# Patient Record
Sex: FEMALE | Race: BLACK OR AFRICAN AMERICAN | Employment: FULL TIME | URBAN - METROPOLITAN AREA
[De-identification: names, ages, dates, MRNs, and addresses within clinical notes are randomized per-mention and may not be internally consistent; named-entity substitution may affect disease eponyms.]

---

## 2017-08-29 ENCOUNTER — HOSPITAL ENCOUNTER (EMERGENCY)
Facility: HOSPITAL | Age: 27
Discharge: HOME | End: 2017-08-29
Attending: EMERGENCY MEDICINE

## 2017-08-29 ENCOUNTER — APPOINTMENT (OUTPATIENT)
Dept: ULTRASOUND IMAGING | Facility: HOSPITAL | Age: 27
End: 2017-08-29
Attending: EMERGENCY MEDICINE

## 2017-08-29 VITALS
HEIGHT: 61 IN | TEMPERATURE: 97 F | BODY MASS INDEX: 49.09 KG/M2 | WEIGHT: 260 LBS | SYSTOLIC BLOOD PRESSURE: 134 MMHG | HEART RATE: 58 BPM | DIASTOLIC BLOOD PRESSURE: 86 MMHG | OXYGEN SATURATION: 98 % | RESPIRATION RATE: 18 BRPM

## 2017-08-29 DIAGNOSIS — N39.0 URINARY TRACT INFECTION WITHOUT HEMATURIA, SITE UNSPECIFIED: Primary | ICD-10-CM

## 2017-08-29 DIAGNOSIS — O20.0 THREATENED ABORTION: ICD-10-CM

## 2017-08-29 LAB
B-HCG SERPL-ACNC: 1742 MIU/ML
B-HCG UR QL: POSITIVE
BASOPHILS # BLD: 0.1 K/UL (ref 0–0.2)
BASOPHILS NFR BLD: 1 %
BILIRUB UR QL: NEGATIVE
COLOR UR: YELLOW
EOSINOPHIL # BLD: 0.3 K/UL (ref 0–0.7)
EOSINOPHIL NFR BLD: 3 %
ERYTHROCYTE [DISTWIDTH] IN BLOOD BY AUTOMATED COUNT: 19 % (ref 11–15)
GLUCOSE UR-MCNC: NEGATIVE MG/DL
HCT VFR BLD AUTO: 33.9 % (ref 35–48)
HGB BLD-MCNC: 10.5 G/DL (ref 12–16)
KETONES UR-MCNC: NEGATIVE MG/DL
LYMPHOCYTES # BLD: 2.2 K/UL (ref 1–4)
LYMPHOCYTES NFR BLD: 24 %
MCH RBC QN AUTO: 21.8 PG (ref 27–32)
MCHC RBC AUTO-ENTMCNC: 31 G/DL (ref 32–37)
MCV RBC AUTO: 70.3 FL (ref 80–100)
MONOCYTES # BLD: 1.3 K/UL (ref 0–1)
MONOCYTES NFR BLD: 14 %
NEUTROPHILS # BLD AUTO: 5.3 K/UL (ref 1.8–7.7)
NEUTROPHILS NFR BLD: 59 %
NITRITE UR QL STRIP.AUTO: POSITIVE
PH UR: 5 [PH] (ref 5–8)
PLATELET # BLD AUTO: 280 K/UL (ref 140–400)
PMV BLD AUTO: 8.8 FL (ref 7.4–10.3)
PROT UR-MCNC: 30 MG/DL
RBC # BLD AUTO: 4.82 M/UL (ref 3.7–5.4)
RBC #/AREA URNS AUTO: 191 /HPF
RH BLOOD TYPE: POSITIVE
SP GR UR STRIP: 1.03 (ref 1–1.03)
UROBILINOGEN UR STRIP-ACNC: 4
VIT C UR-MCNC: NEGATIVE MG/DL
WBC # BLD AUTO: 9 K/UL (ref 4–11)
WBC #/AREA URNS AUTO: 39 /HPF

## 2017-08-29 PROCEDURE — 84702 CHORIONIC GONADOTROPIN TEST: CPT | Performed by: EMERGENCY MEDICINE

## 2017-08-29 PROCEDURE — 81001 URINALYSIS AUTO W/SCOPE: CPT | Performed by: EMERGENCY MEDICINE

## 2017-08-29 PROCEDURE — 81025 URINE PREGNANCY TEST: CPT

## 2017-08-29 PROCEDURE — 86901 BLOOD TYPING SEROLOGIC RH(D): CPT | Performed by: EMERGENCY MEDICINE

## 2017-08-29 PROCEDURE — 87086 URINE CULTURE/COLONY COUNT: CPT

## 2017-08-29 PROCEDURE — 87147 CULTURE TYPE IMMUNOLOGIC: CPT | Performed by: EMERGENCY MEDICINE

## 2017-08-29 PROCEDURE — 99284 EMERGENCY DEPT VISIT MOD MDM: CPT

## 2017-08-29 PROCEDURE — 36415 COLL VENOUS BLD VENIPUNCTURE: CPT

## 2017-08-29 PROCEDURE — 76817 TRANSVAGINAL US OBSTETRIC: CPT | Performed by: EMERGENCY MEDICINE

## 2017-08-29 PROCEDURE — 86900 BLOOD TYPING SEROLOGIC ABO: CPT | Performed by: EMERGENCY MEDICINE

## 2017-08-29 PROCEDURE — 76801 OB US < 14 WKS SINGLE FETUS: CPT | Performed by: EMERGENCY MEDICINE

## 2017-08-29 PROCEDURE — 81001 URINALYSIS AUTO W/SCOPE: CPT

## 2017-08-29 PROCEDURE — 87086 URINE CULTURE/COLONY COUNT: CPT | Performed by: EMERGENCY MEDICINE

## 2017-08-29 PROCEDURE — 85025 COMPLETE CBC W/AUTO DIFF WBC: CPT | Performed by: EMERGENCY MEDICINE

## 2017-08-29 RX ORDER — CEPHALEXIN 250 MG/5ML
500 POWDER, FOR SUSPENSION ORAL 3 TIMES DAILY
Qty: 150 ML | Refills: 0 | Status: SHIPPED | OUTPATIENT
Start: 2017-08-29 | End: 2017-09-03

## 2017-08-30 NOTE — ED PROVIDER NOTES
Patient Seen in: [facility] AND [facility] Emergency Department    History   Patient presents with:  Abdomen/Flank Pain (GI/)    Stated Complaint: abd pain    HPI    Patient is a 81-year-old  A1 who presents with lower abdominal cramping ×5 days.   Patient no cvat  Extremities: FROM of all extremities, no cyanosis/clubbing/edema  Neuro: CN intact, normal speech, normal gait, 5/5 motor strength in all extremities, no focal deficits  SKIN: warm, dry, no rashes        ED Course     Labs Reviewed   URINALYSIS [place] 2017  CONCLUSION:  1. Irregularly marginated gestational sac within the lower uterine segment. No fetal pole identified. No fetal cardiac activity recorded.  Findings are most suggestive of spontaneous  given low position of the gestational sac

## 2018-12-16 ENCOUNTER — HOSPITAL ENCOUNTER (INPATIENT)
Facility: HOSPITAL | Age: 28
LOS: 2 days | Discharge: HOME | End: 2018-12-18
Attending: OBSTETRICS & GYNECOLOGY | Admitting: OBSTETRICS & GYNECOLOGY
Payer: MEDICAID

## 2018-12-16 ENCOUNTER — ANESTHESIA (OUTPATIENT)
Dept: OBGYN UNIT | Facility: HOSPITAL | Age: 28
End: 2018-12-16
Payer: MEDICAID

## 2018-12-16 ENCOUNTER — ANESTHESIA EVENT (OUTPATIENT)
Dept: OBGYN UNIT | Facility: HOSPITAL | Age: 28
End: 2018-12-16
Payer: MEDICAID

## 2018-12-16 PROBLEM — O09.30 NO PRENATAL CARE IN CURRENT PREGNANCY: Status: ACTIVE | Noted: 2018-12-16

## 2018-12-16 PROBLEM — Z78.9 NOT IMMUNE TO RUBELLA: Status: ACTIVE | Noted: 2018-12-16

## 2018-12-16 PROBLEM — Z37.9 NORMAL LABOR: Status: ACTIVE | Noted: 2018-12-16

## 2018-12-16 PROCEDURE — 59409 OBSTETRICAL CARE: CPT | Performed by: OBSTETRICS & GYNECOLOGY

## 2018-12-16 RX ORDER — AMMONIA INHALANTS 0.04 G/.3ML
0.3 INHALANT RESPIRATORY (INHALATION) AS NEEDED
Status: DISCONTINUED | OUTPATIENT
Start: 2018-12-16 | End: 2018-12-18

## 2018-12-16 RX ORDER — PRENATAL VIT,CAL 76/IRON/FOLIC 29 MG-1 MG
1 TABLET ORAL DAILY
Status: DISCONTINUED | OUTPATIENT
Start: 2018-12-16 | End: 2018-12-18

## 2018-12-16 RX ORDER — EPHEDRINE SULFATE/0.9% NACL/PF 25 MG/5 ML
5 SYRINGE (ML) INTRAVENOUS AS NEEDED
Status: DISCONTINUED | OUTPATIENT
Start: 2018-12-16 | End: 2018-12-16

## 2018-12-16 RX ORDER — NALBUPHINE HCL 10 MG/ML
2.5 AMPUL (ML) INJECTION
Status: DISCONTINUED | OUTPATIENT
Start: 2018-12-16 | End: 2018-12-16

## 2018-12-16 RX ORDER — LIDOCAINE HYDROCHLORIDE 10 MG/ML
30 INJECTION, SOLUTION EPIDURAL; INFILTRATION; INTRACAUDAL; PERINEURAL ONCE
Status: DISCONTINUED | OUTPATIENT
Start: 2018-12-16 | End: 2018-12-16

## 2018-12-16 RX ORDER — TRISODIUM CITRATE DIHYDRATE AND CITRIC ACID MONOHYDRATE 500; 334 MG/5ML; MG/5ML
30 SOLUTION ORAL AS NEEDED
Status: DISCONTINUED | OUTPATIENT
Start: 2018-12-16 | End: 2018-12-16

## 2018-12-16 RX ORDER — SODIUM CHLORIDE, SODIUM LACTATE, POTASSIUM CHLORIDE, CALCIUM CHLORIDE 600; 310; 30; 20 MG/100ML; MG/100ML; MG/100ML; MG/100ML
INJECTION, SOLUTION INTRAVENOUS
Status: DISPENSED
Start: 2018-12-16 | End: 2018-12-16

## 2018-12-16 RX ORDER — TERBUTALINE SULFATE 1 MG/ML
0.25 INJECTION, SOLUTION SUBCUTANEOUS AS NEEDED
Status: DISCONTINUED | OUTPATIENT
Start: 2018-12-16 | End: 2018-12-16

## 2018-12-16 RX ORDER — DIAPER,BRIEF,INFANT-TODD,DISP
1 EACH MISCELLANEOUS EVERY 6 HOURS PRN
Status: DISCONTINUED | OUTPATIENT
Start: 2018-12-16 | End: 2018-12-18

## 2018-12-16 RX ORDER — SODIUM CHLORIDE 0.9 % (FLUSH) 0.9 %
10 SYRINGE (ML) INJECTION AS NEEDED
Status: DISCONTINUED | OUTPATIENT
Start: 2018-12-16 | End: 2018-12-16

## 2018-12-16 RX ORDER — BUPIVACAINE HYDROCHLORIDE 2.5 MG/ML
INJECTION, SOLUTION EPIDURAL; INFILTRATION; INTRACAUDAL
Status: DISPENSED
Start: 2018-12-16 | End: 2018-12-16

## 2018-12-16 RX ORDER — ONDANSETRON 2 MG/ML
4 INJECTION INTRAMUSCULAR; INTRAVENOUS EVERY 6 HOURS PRN
Status: DISCONTINUED | OUTPATIENT
Start: 2018-12-16 | End: 2018-12-18

## 2018-12-16 RX ORDER — LIDOCAINE HYDROCHLORIDE 10 MG/ML
INJECTION, SOLUTION EPIDURAL; INFILTRATION; INTRACAUDAL; PERINEURAL AS NEEDED
Status: DISCONTINUED | OUTPATIENT
Start: 2018-12-16 | End: 2018-12-16 | Stop reason: SURG

## 2018-12-16 RX ORDER — METHYLERGONOVINE MALEATE 0.2 MG/ML
INJECTION INTRAVENOUS
Status: DISPENSED
Start: 2018-12-16 | End: 2018-12-17

## 2018-12-16 RX ORDER — LIDOCAINE HYDROCHLORIDE AND EPINEPHRINE 20; 5 MG/ML; UG/ML
10 INJECTION, SOLUTION EPIDURAL; INFILTRATION; INTRACAUDAL; PERINEURAL ONCE
Status: DISCONTINUED | OUTPATIENT
Start: 2018-12-16 | End: 2018-12-16

## 2018-12-16 RX ORDER — ONDANSETRON 2 MG/ML
INJECTION INTRAMUSCULAR; INTRAVENOUS
Status: COMPLETED
Start: 2018-12-16 | End: 2018-12-16

## 2018-12-16 RX ORDER — IBUPROFEN 600 MG/1
600 TABLET ORAL ONCE AS NEEDED
Status: DISCONTINUED | OUTPATIENT
Start: 2018-12-16 | End: 2018-12-16

## 2018-12-16 RX ORDER — SODIUM CHLORIDE, SODIUM LACTATE, POTASSIUM CHLORIDE, CALCIUM CHLORIDE 600; 310; 30; 20 MG/100ML; MG/100ML; MG/100ML; MG/100ML
INJECTION, SOLUTION INTRAVENOUS
Status: COMPLETED
Start: 2018-12-16 | End: 2018-12-16

## 2018-12-16 RX ORDER — IBUPROFEN 600 MG/1
600 TABLET ORAL EVERY 6 HOURS
Status: DISCONTINUED | OUTPATIENT
Start: 2018-12-16 | End: 2018-12-18

## 2018-12-16 RX ORDER — ONDANSETRON 2 MG/ML
4 INJECTION INTRAMUSCULAR; INTRAVENOUS EVERY 6 HOURS PRN
Status: DISCONTINUED | OUTPATIENT
Start: 2018-12-16 | End: 2018-12-16

## 2018-12-16 RX ORDER — PRENATAL VIT/IRON FUM/FOLIC AC 27MG-0.8MG
1 TABLET ORAL DAILY
COMMUNITY
End: 2019-01-22

## 2018-12-16 RX ORDER — DEXTROSE, SODIUM CHLORIDE, SODIUM LACTATE, POTASSIUM CHLORIDE, AND CALCIUM CHLORIDE 5; .6; .31; .03; .02 G/100ML; G/100ML; G/100ML; G/100ML; G/100ML
125 INJECTION, SOLUTION INTRAVENOUS CONTINUOUS
Status: DISCONTINUED | OUTPATIENT
Start: 2018-12-16 | End: 2018-12-16

## 2018-12-16 RX ORDER — BUPIVACAINE HYDROCHLORIDE 2.5 MG/ML
10 INJECTION, SOLUTION EPIDURAL; INFILTRATION; INTRACAUDAL ONCE
Status: COMPLETED | OUTPATIENT
Start: 2018-12-16 | End: 2018-12-16

## 2018-12-16 RX ORDER — 0.9 % SODIUM CHLORIDE 0.9 %
VIAL (ML) INJECTION
Status: DISPENSED
Start: 2018-12-16 | End: 2018-12-16

## 2018-12-16 RX ORDER — LIDOCAINE HYDROCHLORIDE AND EPINEPHRINE 15; 5 MG/ML; UG/ML
INJECTION, SOLUTION EPIDURAL AS NEEDED
Status: DISCONTINUED | OUTPATIENT
Start: 2018-12-16 | End: 2018-12-16 | Stop reason: SURG

## 2018-12-16 RX ORDER — DOCUSATE SODIUM 100 MG/1
100 CAPSULE, LIQUID FILLED ORAL 2 TIMES DAILY
Status: DISCONTINUED | OUTPATIENT
Start: 2018-12-16 | End: 2018-12-18

## 2018-12-16 RX ORDER — SIMETHICONE 80 MG
80 TABLET,CHEWABLE ORAL 3 TIMES DAILY PRN
Status: DISCONTINUED | OUTPATIENT
Start: 2018-12-16 | End: 2018-12-18

## 2018-12-16 RX ORDER — BISACODYL 10 MG
10 SUPPOSITORY, RECTAL RECTAL ONCE AS NEEDED
Status: DISCONTINUED | OUTPATIENT
Start: 2018-12-16 | End: 2018-12-18

## 2018-12-16 RX ORDER — EPHEDRINE SULFATE/0.9% NACL/PF 25 MG/5 ML
SYRINGE (ML) INTRAVENOUS
Status: DISCONTINUED
Start: 2018-12-16 | End: 2018-12-16 | Stop reason: WASHOUT

## 2018-12-16 RX ORDER — SODIUM CHLORIDE 0.9 % (FLUSH) 0.9 %
10 SYRINGE (ML) INJECTION AS NEEDED
Status: DISCONTINUED | OUTPATIENT
Start: 2018-12-16 | End: 2018-12-18

## 2018-12-16 RX ORDER — AMMONIA INHALANTS 0.04 G/.3ML
0.3 INHALANT RESPIRATORY (INHALATION) AS NEEDED
Status: DISCONTINUED | OUTPATIENT
Start: 2018-12-16 | End: 2018-12-16

## 2018-12-16 RX ORDER — ACETAMINOPHEN 325 MG/1
650 TABLET ORAL EVERY 6 HOURS PRN
Status: DISCONTINUED | OUTPATIENT
Start: 2018-12-16 | End: 2018-12-18

## 2018-12-16 RX ADMIN — BUPIVACAINE HYDROCHLORIDE 10 ML: 2.5 INJECTION, SOLUTION EPIDURAL; INFILTRATION; INTRACAUDAL at 06:42:00

## 2018-12-16 RX ADMIN — LIDOCAINE HYDROCHLORIDE 5 ML: 10 INJECTION, SOLUTION EPIDURAL; INFILTRATION; INTRACAUDAL; PERINEURAL at 06:37:00

## 2018-12-16 RX ADMIN — LIDOCAINE HYDROCHLORIDE AND EPINEPHRINE 3 ML: 15; 5 INJECTION, SOLUTION EPIDURAL at 06:42:00

## 2018-12-16 NOTE — ANESTHESIA PROCEDURE NOTES
Labor Analgesia  Performed by: Danii Main MD  Authorized by: Danii Main MD     Patient Location:  OB  Reason for Block: labor epidural    Anesthesiologist:  Danii Main MD  Performed by:   Anesthesiologist  Preanesthetic Checklist: lalo

## 2018-12-16 NOTE — ANESTHESIA POSTPROCEDURE EVALUATION
Patient: Anabel Kiran    Procedure Summary     Date:  12/16/18 Room / Location:      Anesthesia Start:  0636 Anesthesia Stop:  0288    Procedure:  LABOR ANALGESIA Diagnosis:      Scheduled Providers:   Anesthesiologist:  MD Leticia Marques

## 2018-12-16 NOTE — H&P
Leonora 23 Patient Status:  Inpatient    1990 MRN [id number]   Location [address] [place] [address] Attending Marianna Max MD   Hosp Day # 0 PCP PHYSICIAN NONSTAFF     Date of Ad 12/15/2018 at Unknown time     Allergies: No Known Allergies    OBJECTIVE:    Vitals:    AFVSS     Constitution:  Obese female in mild to moderate acute distress   HEENT:  WNL   Neck:  no thyromegaly or lymphadenopathy    Lungs:   clear to ausculation bila

## 2018-12-16 NOTE — PROGRESS NOTES
12/16/2018, 10:05 AM    Subjective:  Patient is comfortable with epidural. Called because she thinks her water broke.      Objective:   12/16/18  0800 12/16/18 0827 12/16/18  0900 12/16/18 0918   BP:  137/65     Pulse:  56  57   Temp:   99.5 °F (37.5 °C)

## 2018-12-16 NOTE — PLAN OF CARE
POSTPARTUM    • Optimize infant feeding at the breast Not Progressing    • Establishment of adequate milk supply with medication/procedure interruptions Not Progressing    • Experiences normal breast weaning course Not Jean Cerda has stated t

## 2018-12-16 NOTE — ANESTHESIA PREPROCEDURE EVALUATION
Anesthesia PreOp Note    HPI:     Elise Perera is a 33year old female who presents for preoperative consultation requested by: * No surgeons listed *    Date of Surgery: 12/16/2018    * No procedures listed *  Indication: * No pre-op diagnosis entered epidural infusion  Epidural Continuous Kalli Armenta MD Last Rate: 10 mL/hr at 12/16/18 0646 10 mL/hr at 12/16/18 0646   fentaNYL citrate (SUBLIMAZE) 0.05 MG/ML injection 100 mcg 100 mcg Epidural Once Kalli Armenta MD     EPHEDrine sulfate in NaCl file      Sexual activity: Not on file    Other Topics      Concerns:        Not on file    Social History Narrative      Not on file      Available pre-op labs reviewed.   Lab Results   Component Value Date    WBC 8.6 12/16/2018    RBC 4.67 12/16/2018    H

## 2018-12-16 NOTE — PROGRESS NOTES
Pt is a 33year old female admitted to TR2/TR2-A. Patient presents with:  Onset Of Labor: Pt c/o cntxs since 100. Pt denies vaginal bleeding or LOF.  +FM     Pt is  40w6d intra-uterine pregnancy. History obtained, consents signed.  Oriented to r

## 2018-12-16 NOTE — PROGRESS NOTES
Dr. Kiki Saleh in department and made aware of pt arrival to unit and c/o cntxs as well as h/o no PNC and EDC per pt per [facility],[address]. Dr. Kiki Saleh aware unable to palpate presenting part upon exam.  Dawson Mondragon brought to bedside awaiting Dr. Kiki Saleh.

## 2018-12-16 NOTE — L&D DELIVERY NOTE
[facility]    Vaginal Delivery Note    Jayson Olivia Patient Status:  Inpatient    1990 MRN [id number]   Location [address]  Attending Regino Crenshaw MD   Hosp Day # 0 PCP PHYSICIAN JONO Lopez

## 2018-12-17 PROCEDURE — 99232 SBSQ HOSP IP/OBS MODERATE 35: CPT | Performed by: OBSTETRICS & GYNECOLOGY

## 2018-12-17 RX ORDER — MELATONIN
325 2 TIMES DAILY WITH MEALS
Status: DISCONTINUED | OUTPATIENT
Start: 2018-12-17 | End: 2018-12-18

## 2018-12-17 NOTE — PROGRESS NOTES
[facility]    OB/GYNE Progress Note      Elise Perera Patient Status:  Inpatient    1990 MRN [id number]   Location [facility] 3SE Attending Kathya Kate MD   Hosp Day # 1 PCP PHYSICIAN NONSTAFF        Assessment/Plan 12/17/2018       Lab Results   Component Value Date    COLORUR Yellow 08/29/2017    CLARITY Cloudy (A) 08/29/2017    SPECGRAVITY 1.026 08/29/2017    PROUR 30  (A) 08/29/2017    GLUUR Negative 08/29/2017    KETUR Negative 08/29/2017    BILUR Negative 08/29/

## 2018-12-17 NOTE — CM/SW NOTE
MDO received indicating pt's previous homelessness during pregnancy. Pt did not establish regular prenatal care for this pregnancy, having intermittent care between [facility] and Robbi Epperson, ultimately delivering at [address] [place] [address] via walk-in.  Notes indicated she expec

## 2018-12-18 VITALS
RESPIRATION RATE: 16 BRPM | OXYGEN SATURATION: 100 % | SYSTOLIC BLOOD PRESSURE: 128 MMHG | TEMPERATURE: 98 F | HEIGHT: 67 IN | HEART RATE: 57 BPM | DIASTOLIC BLOOD PRESSURE: 62 MMHG | BODY MASS INDEX: 33.74 KG/M2 | WEIGHT: 215 LBS

## 2018-12-18 PROCEDURE — 99238 HOSP IP/OBS DSCHRG MGMT 30/<: CPT | Performed by: OBSTETRICS & GYNECOLOGY

## 2018-12-18 RX ORDER — IBUPROFEN 600 MG/1
600 TABLET ORAL EVERY 6 HOURS
Qty: 30 TABLET | Refills: 0 | Status: SHIPPED | OUTPATIENT
Start: 2018-12-18 | End: 2019-01-22

## 2018-12-18 RX ORDER — MELATONIN
325 2 TIMES DAILY WITH MEALS
Qty: 60 TABLET | Refills: 0 | Status: ON HOLD | OUTPATIENT
Start: 2018-12-18 | End: 2019-09-10

## 2018-12-18 NOTE — PLAN OF CARE
Patient educated on signs of illness, increase bleeding, signs of infection, post partum. Mother bonding with  appropriately. Mother care and feeding of  is effective.

## 2018-12-18 NOTE — PROGRESS NOTES
[facility]    OB/GYNE Progress Note      Erlinda Mormonism Patient Status:  Inpatient    1990 MRN [id number]   Location [facility] 3SE Attending Sarah Thomas MD   Hosp Day # 2 PCP PHYSICIAN NONSTAFF        Assessment/Plan     No Negative 10/21/2018    ABO O 12/16/2018    RH Positive 12/16/2018    WBC 13.2 (H) 12/17/2018    HGB 8.2 (L) 12/17/2018    HCT 26.4 (L) 12/17/2018     12/17/2018    AST 18 12/17/2018    ALT 10 (L) 12/17/2018       Lab Results   Component Value Date

## 2018-12-18 NOTE — DISCHARGE SUMMARY
[facility]    Discharge Summary    Mary Cortez Patient Status:  Inpatient    1990 MRN [id number]   Location [facility] 3SE Attending Sushila Keenan MD   [address] Day # 2       Primary OB Clinician: No prenatal care.     EDC: Es

## 2018-12-18 NOTE — BH PROGRESS NOTE
Postpartum Assessment Questions to be used when patient scores a 10 or higher on the [place] Postpartum Depression Scale  1. How is your appetite? Are you hungrier or less hungry than usual?  Eating regularly? Normal appetite  2.  Are you sleeping okay w per patient, her mom, brother and PHILL. a. Is it what you expected and what you need? Yes  19. Are you taking any medications regularly? No  20. How would you feel about taking medication for depression if it makes you feel better?  N/A  Patient’s presenta

## 2018-12-18 NOTE — CM/SW NOTE
SW discussed with Psych Liaison/Melanie who had met with the pt for assessment and resources. Pt had declined any needs. SW spoke with the pt who currently declines any needs or concerns.  Pt verified insurance plans, pediatric plans, had no other questio

## 2019-01-07 ENCOUNTER — TELEPHONE (OUTPATIENT)
Dept: ADMINISTRATIVE | Age: 29
End: 2019-01-07

## 2019-01-10 NOTE — TELEPHONE ENCOUNTER
Dr. Jolie Martin,    Patient did not have prenatal care but delivered at [facility] and has a postpartum appt. with you on 1/21/19. I have attached a FMLA that requires your signature. Patient want to be off 6 weeks.     Please sign off on form:  -Highlight the pat

## 2019-01-11 ENCOUNTER — TELEPHONE (OUTPATIENT)
Dept: OBGYN CLINIC | Facility: CLINIC | Age: 29
End: 2019-01-11

## 2019-01-11 NOTE — TELEPHONE ENCOUNTER
Pt requesting a note to return to work sooner, Pt states she would like to return to work 1/12/19.      Please advise

## 2019-01-14 NOTE — TELEPHONE ENCOUNTER
Pt did not have prenatal care with us, but had a vaginal birth with [address]. Pt has a postpartum  1-21-9 with [address]. Pt wanted to go back to work on 1-12-19. 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

## 2019-01-14 NOTE — TELEPHONE ENCOUNTER
Assisted pt with scheduling first available with [facility] for 1/16/19 at 11am at [address] [place] [address]. Location information provided to pt and pt verbalized understanding.

## 2019-01-21 ENCOUNTER — TELEPHONE (OUTPATIENT)
Dept: OBGYN CLINIC | Facility: CLINIC | Age: 29
End: 2019-01-21

## 2019-01-21 NOTE — TELEPHONE ENCOUNTER
Pt had Post partum visit scheduled for today, was cancelled by RN. Pt wasn't aware appt was cxl. Requesting to be seen ASAP.

## 2019-01-21 NOTE — TELEPHONE ENCOUNTER
PER PT CALLING TO FIND OUT WHY HER APPT WAS CANCELLED FOR TODAY FOR HER PP CHECK UP /  PT STATE SHE TOOK OFF WORK TODAY FOR THIS APPT / SHE WOULD LIKE TO KNOW IF SHE COULD BE SQUEEZED IN FOR TODAY / PT RE[facility]ING TO [facility] [phone]

## 2019-01-21 NOTE — TELEPHONE ENCOUNTER
Notified her appt for today was canceled on 1/14 by the nurse she talked to because pt was rescheduled to 1/16 at 11 am in [place]. Pt states she canceled the [place] appt because that location is farther and she does not have a car.  Informed pt [facility] is 100 % booke

## 2019-01-22 ENCOUNTER — POSTPARTUM (OUTPATIENT)
Dept: OBGYN CLINIC | Facility: CLINIC | Age: 29
End: 2019-01-22
Payer: MEDICAID

## 2019-01-22 VITALS
SYSTOLIC BLOOD PRESSURE: 138 MMHG | BODY MASS INDEX: 37 KG/M2 | WEIGHT: 236 LBS | HEART RATE: 62 BPM | DIASTOLIC BLOOD PRESSURE: 87 MMHG

## 2019-01-22 PROCEDURE — 99213 OFFICE O/P EST LOW 20 MIN: CPT | Performed by: OBSTETRICS & GYNECOLOGY

## 2019-01-22 NOTE — PROGRESS NOTES
Here for post-partum visit. Pt had a vaginal delivery on 12/16/18 with Dr. Mickey Barroso. Infant- girl doing well. There were no complications. Pt is bottle feeding. Pt desire condoms for contraception. LMP - none (stopped bleeding over two weeks ago).   Cur

## 2019-09-10 ENCOUNTER — HOSPITAL ENCOUNTER (OUTPATIENT)
Facility: HOSPITAL | Age: 29
Setting detail: OBSERVATION
Discharge: ANOTHER HOSPITAL | End: 2019-09-10
Attending: OBSTETRICS & GYNECOLOGY | Admitting: OBSTETRICS & GYNECOLOGY
Payer: MEDICAID

## 2019-09-10 ENCOUNTER — APPOINTMENT (OUTPATIENT)
Dept: ULTRASOUND IMAGING | Facility: HOSPITAL | Age: 29
End: 2019-09-10
Attending: OBSTETRICS & GYNECOLOGY
Payer: MEDICAID

## 2019-09-10 ENCOUNTER — HOSPITAL ENCOUNTER (INPATIENT)
Facility: HOSPITAL | Age: 29
LOS: 2 days | Discharge: HOME | End: 2019-09-12
Attending: OBSTETRICS & GYNECOLOGY | Admitting: OBSTETRICS & GYNECOLOGY
Payer: MEDICAID

## 2019-09-10 VITALS
DIASTOLIC BLOOD PRESSURE: 59 MMHG | TEMPERATURE: 99 F | BODY MASS INDEX: 37.35 KG/M2 | HEIGHT: 67 IN | HEART RATE: 88 BPM | WEIGHT: 238 LBS | RESPIRATION RATE: 16 BRPM | OXYGEN SATURATION: 99 % | SYSTOLIC BLOOD PRESSURE: 117 MMHG

## 2019-09-10 PROBLEM — Z34.90 NORMAL PREGNANCY: Status: ACTIVE | Noted: 2019-09-10

## 2019-09-10 PROBLEM — O99.013 ANEMIA DURING PREGNANCY IN THIRD TRIMESTER: Status: ACTIVE | Noted: 2019-09-10

## 2019-09-10 PROBLEM — O23.43 UTI IN PREGNANCY, ANTEPARTUM, THIRD TRIMESTER: Status: ACTIVE | Noted: 2019-09-10

## 2019-09-10 PROBLEM — O47.00: Status: ACTIVE | Noted: 2019-09-10

## 2019-09-10 PROBLEM — O60.03 PRETERM LABOR IN THIRD TRIMESTER: Status: ACTIVE | Noted: 2019-09-10

## 2019-09-10 PROBLEM — Z28.3 RUBELLA NON-IMMUNE STATUS, ANTEPARTUM: Status: ACTIVE | Noted: 2018-12-16

## 2019-09-10 PROBLEM — O60.03 PRETERM LABOR IN THIRD TRIMESTER WITHOUT DELIVERY: Status: ACTIVE | Noted: 2019-09-10

## 2019-09-10 PROBLEM — O99.891 RUBELLA NON-IMMUNE STATUS, ANTEPARTUM: Status: ACTIVE | Noted: 2018-12-16

## 2019-09-10 LAB
AMPHET UR QL SCN: NEGATIVE
ANTIBODY SCREEN: NEGATIVE
BACTERIA UR QL AUTO: NEGATIVE /HPF
BASOPHILS # BLD AUTO: 0.04 X10(3) UL (ref 0–0.2)
BASOPHILS NFR BLD AUTO: 0.3 %
BILIRUB UR QL: NEGATIVE
C TRACH DNA SPEC QL NAA+PROBE: NEGATIVE
CANNABINOIDS UR QL SCN: NEGATIVE
COCAINE UR QL: NEGATIVE
COLOR UR: YELLOW
DEPRECATED RDW RBC AUTO: 43 FL (ref 35.1–46.3)
EOSINOPHIL # BLD AUTO: 0.23 X10(3) UL (ref 0–0.7)
EOSINOPHIL NFR BLD AUTO: 1.7 %
ERYTHROCYTE [DISTWIDTH] IN BLOOD BY AUTOMATED COUNT: 17.4 % (ref 11–15)
GLUCOSE UR-MCNC: NEGATIVE MG/DL
HBV SURFACE AG SER-ACNC: <0.1 [IU]/L
HBV SURFACE AG SERPL QL IA: NONREACTIVE
HCT VFR BLD AUTO: 29.1 % (ref 35–48)
HGB BLD-MCNC: 8.8 G/DL (ref 12–16)
HIV1+2 AB SPEC QL IA.RAPID: NONREACTIVE
IMM GRANULOCYTES # BLD AUTO: 0.07 X10(3) UL (ref 0–1)
IMM GRANULOCYTES NFR BLD: 0.5 %
KETONES UR-MCNC: NEGATIVE MG/DL
LYMPHOCYTES # BLD AUTO: 1.54 X10(3) UL (ref 1–4)
LYMPHOCYTES NFR BLD AUTO: 11.6 %
MCH RBC QN AUTO: 21 PG (ref 26–34)
MCHC RBC AUTO-ENTMCNC: 30.2 G/DL (ref 31–37)
MCV RBC AUTO: 69.3 FL (ref 80–100)
MDMA UR QL SCN: NEGATIVE
MONOCYTES # BLD AUTO: 1.38 X10(3) UL (ref 0.1–1)
MONOCYTES NFR BLD AUTO: 10.4 %
N GONORRHOEA DNA SPEC QL NAA+PROBE: NEGATIVE
NEUTROPHILS # BLD AUTO: 10.01 X10 (3) UL (ref 1.5–7.7)
NEUTROPHILS # BLD AUTO: 10.01 X10(3) UL (ref 1.5–7.7)
NEUTROPHILS NFR BLD AUTO: 75.5 %
NITRITE UR QL STRIP.AUTO: NEGATIVE
OPIATES UR QL SCN: NEGATIVE
OXYCODONE UR QL SCN: NEGATIVE
PCP UR QL SCN: NEGATIVE
PH UR: 8 [PH] (ref 5–8)
PLATELET # BLD AUTO: 268 10(3)UL (ref 150–450)
PROT UR-MCNC: NEGATIVE MG/DL
RBC # BLD AUTO: 4.2 X10(6)UL (ref 3.8–5.3)
RBC #/AREA URNS AUTO: 8 /HPF
RH BLOOD TYPE: POSITIVE
RUBV IGG SER QL: NEGATIVE
RUBV IGG SER-ACNC: 3.6 IU/ML (ref 10–?)
SP GR UR STRIP: 1.01 (ref 1–1.03)
UROBILINOGEN UR STRIP-ACNC: <2
VIT C UR-MCNC: NEGATIVE MG/DL
WBC # BLD AUTO: 13.3 X10(3) UL (ref 4–11)
WBC #/AREA URNS AUTO: 162 /HPF

## 2019-09-10 PROCEDURE — 99219 INITIAL OBSERVATION CARE,LEVL II: CPT | Performed by: OBSTETRICS & GYNECOLOGY

## 2019-09-10 PROCEDURE — 10907ZC DRAINAGE OF AMNIOTIC FLUID, THERAPEUTIC FROM PRODUCTS OF CONCEPTION, VIA NATURAL OR ARTIFICIAL OPENING: ICD-10-PCS | Performed by: OBSTETRICS & GYNECOLOGY

## 2019-09-10 PROCEDURE — 59409 OBSTETRICAL CARE: CPT | Performed by: OBSTETRICS & GYNECOLOGY

## 2019-09-10 PROCEDURE — 76816 OB US FOLLOW-UP PER FETUS: CPT | Performed by: OBSTETRICS & GYNECOLOGY

## 2019-09-10 RX ORDER — ZOLPIDEM TARTRATE 5 MG/1
5 TABLET ORAL NIGHTLY PRN
Status: DISCONTINUED | OUTPATIENT
Start: 2019-09-10 | End: 2019-09-12

## 2019-09-10 RX ORDER — METHYLERGONOVINE MALEATE 0.2 MG/ML
0.2 INJECTION INTRAVENOUS ONCE
Status: DISCONTINUED | OUTPATIENT
Start: 2019-09-10 | End: 2019-09-10

## 2019-09-10 RX ORDER — ACETAMINOPHEN 325 MG/1
650 TABLET ORAL EVERY 6 HOURS PRN
Status: DISCONTINUED | OUTPATIENT
Start: 2019-09-10 | End: 2019-09-12

## 2019-09-10 RX ORDER — BETAMETHASONE SODIUM PHOSPHATE AND BETAMETHASONE ACETATE 3; 3 MG/ML; MG/ML
INJECTION, SUSPENSION INTRA-ARTICULAR; INTRALESIONAL; INTRAMUSCULAR; SOFT TISSUE
Status: COMPLETED
Start: 2019-09-10 | End: 2019-09-10

## 2019-09-10 RX ORDER — SODIUM CHLORIDE, SODIUM LACTATE, POTASSIUM CHLORIDE, CALCIUM CHLORIDE 600; 310; 30; 20 MG/100ML; MG/100ML; MG/100ML; MG/100ML
INJECTION, SOLUTION INTRAVENOUS CONTINUOUS
Status: DISCONTINUED | OUTPATIENT
Start: 2019-09-10 | End: 2019-09-10

## 2019-09-10 RX ORDER — METHYLERGONOVINE MALEATE 0.2 MG/ML
0.2 INJECTION INTRAVENOUS ONCE
Status: COMPLETED | OUTPATIENT
Start: 2019-09-10 | End: 2019-09-10

## 2019-09-10 RX ORDER — IBUPROFEN 600 MG/1
600 TABLET ORAL ONCE AS NEEDED
Status: DISCONTINUED | OUTPATIENT
Start: 2019-09-10 | End: 2019-09-10

## 2019-09-10 RX ORDER — AMMONIA INHALANTS 0.04 G/.3ML
0.3 INHALANT RESPIRATORY (INHALATION) AS NEEDED
Status: DISCONTINUED | OUTPATIENT
Start: 2019-09-10 | End: 2019-09-10

## 2019-09-10 RX ORDER — METHYLERGONOVINE MALEATE 0.2 MG/ML
INJECTION INTRAVENOUS
Status: COMPLETED
Start: 2019-09-10 | End: 2019-09-10

## 2019-09-10 RX ORDER — BETAMETHASONE SODIUM PHOSPHATE AND BETAMETHASONE ACETATE 3; 3 MG/ML; MG/ML
12 INJECTION, SUSPENSION INTRA-ARTICULAR; INTRALESIONAL; INTRAMUSCULAR; SOFT TISSUE EVERY 24 HOURS
Status: DISCONTINUED | OUTPATIENT
Start: 2019-09-10 | End: 2019-09-10

## 2019-09-10 RX ORDER — ACETAMINOPHEN 500 MG
1000 TABLET ORAL EVERY 6 HOURS PRN
Status: DISCONTINUED | OUTPATIENT
Start: 2019-09-10 | End: 2019-09-10

## 2019-09-10 RX ORDER — DOCUSATE SODIUM 100 MG/1
100 CAPSULE, LIQUID FILLED ORAL
Status: DISCONTINUED | OUTPATIENT
Start: 2019-09-10 | End: 2019-09-12

## 2019-09-10 RX ORDER — ONDANSETRON 2 MG/ML
4 INJECTION INTRAMUSCULAR; INTRAVENOUS EVERY 6 HOURS PRN
Status: DISCONTINUED | OUTPATIENT
Start: 2019-09-10 | End: 2019-09-12

## 2019-09-10 RX ORDER — TERBUTALINE SULFATE 1 MG/ML
0.25 INJECTION, SOLUTION SUBCUTANEOUS AS NEEDED
Status: DISCONTINUED | OUTPATIENT
Start: 2019-09-10 | End: 2019-09-10

## 2019-09-10 RX ORDER — TRISODIUM CITRATE DIHYDRATE AND CITRIC ACID MONOHYDRATE 500; 334 MG/5ML; MG/5ML
30 SOLUTION ORAL AS NEEDED
Status: DISCONTINUED | OUTPATIENT
Start: 2019-09-10 | End: 2019-09-10

## 2019-09-10 RX ORDER — DEXTROSE, SODIUM CHLORIDE, SODIUM LACTATE, POTASSIUM CHLORIDE, AND CALCIUM CHLORIDE 5; .6; .31; .03; .02 G/100ML; G/100ML; G/100ML; G/100ML; G/100ML
INJECTION, SOLUTION INTRAVENOUS CONTINUOUS
Status: DISCONTINUED | OUTPATIENT
Start: 2019-09-10 | End: 2019-09-10

## 2019-09-10 RX ORDER — SIMETHICONE 80 MG
80 TABLET,CHEWABLE ORAL 3 TIMES DAILY PRN
Status: DISCONTINUED | OUTPATIENT
Start: 2019-09-10 | End: 2019-09-12

## 2019-09-10 RX ORDER — ACETAMINOPHEN 500 MG
500 TABLET ORAL EVERY 6 HOURS PRN
Status: DISCONTINUED | OUTPATIENT
Start: 2019-09-10 | End: 2019-09-10

## 2019-09-10 RX ORDER — DEXTROSE, SODIUM CHLORIDE, SODIUM LACTATE, POTASSIUM CHLORIDE, AND CALCIUM CHLORIDE 5; .6; .31; .03; .02 G/100ML; G/100ML; G/100ML; G/100ML; G/100ML
INJECTION, SOLUTION INTRAVENOUS AS NEEDED
Status: DISCONTINUED | OUTPATIENT
Start: 2019-09-10 | End: 2019-09-10

## 2019-09-10 RX ORDER — CALCIUM GLUCONATE 94 MG/ML
1 INJECTION, SOLUTION INTRAVENOUS ONCE AS NEEDED
Status: DISCONTINUED | OUTPATIENT
Start: 2019-09-10 | End: 2019-09-10

## 2019-09-10 RX ORDER — NALBUPHINE HCL 10 MG/ML
2.5 AMPUL (ML) INJECTION
Status: DISCONTINUED | OUTPATIENT
Start: 2019-09-10 | End: 2019-09-10

## 2019-09-10 RX ORDER — BISACODYL 10 MG
10 SUPPOSITORY, RECTAL RECTAL ONCE AS NEEDED
Status: ACTIVE | OUTPATIENT
Start: 2019-09-10 | End: 2019-09-10

## 2019-09-10 RX ORDER — IBUPROFEN 600 MG/1
600 TABLET ORAL EVERY 6 HOURS
Status: DISCONTINUED | OUTPATIENT
Start: 2019-09-10 | End: 2019-09-12

## 2019-09-10 RX ORDER — SODIUM CHLORIDE 0.9 % (FLUSH) 0.9 %
10 SYRINGE (ML) INJECTION AS NEEDED
Status: DISCONTINUED | OUTPATIENT
Start: 2019-09-10 | End: 2019-09-10

## 2019-09-10 RX ORDER — ONDANSETRON 2 MG/ML
INJECTION INTRAMUSCULAR; INTRAVENOUS
Status: DISPENSED
Start: 2019-09-10 | End: 2019-09-11

## 2019-09-10 RX ORDER — EPHEDRINE SULFATE/0.9% NACL/PF 25 MG/5 ML
5 SYRINGE (ML) INTRAVENOUS AS NEEDED
Status: DISCONTINUED | OUTPATIENT
Start: 2019-09-10 | End: 2019-09-10

## 2019-09-10 NOTE — PROGRESS NOTES
Pt is a 30year old female admitted to TR1/TR1-A. Patient presents with:  Contractions: very limited [facility] presents with c/o back pain and contractions. Thinks her due date is mid      Pt is  Unknown intra-uterine pregnancy.   History ob

## 2019-09-10 NOTE — PROGRESS NOTES
S/p epidural, patient is comfortable    /60   Pulse 72   Temp 98.4 °F (36.9 °C) (Oral)   Resp 20   Wt 238 lb   SpO2 98%   BMI 37.28 kg/m²     TOCO: every 3-4 minutes  SVE: 6/90/-2  AROM clear    - expectant management

## 2019-09-10 NOTE — H&P
[address] Patient Status:  Inpatient    1990 MRN [id number]   Location [address] Attending Dilip Chang, [facility] Day # 0 PCP PHYSICIAN NONSTAFF     Date of Admission:  9/10/ significant for no prenatal care. PLAN:    Risks, benefits, alternatives and possible complications have been discussed in detail with the patient. Pre-admission, admission, and post admission procedures and expectations were discussed in detail.   All

## 2019-09-10 NOTE — TRIAGE
[facility]      Triage Note    Nonnie Pleva Patient Status:  Observation    1990 MRN [id number]   Location [address]  Attending Raine Matthews MD   Hosp Day # 0 PCP PHYSICIAN NONSTAFF     On call Laborist [phone]   Resp:       Temp:       SpO2: 99% 99%  99%   Weight:       Height:           NST                                                                                                                                       Additional Comments       Reas

## 2019-09-10 NOTE — CONSULTS
[facility]    Maternal-Fetal Medicine Inpatient Consultation    Date of Admission:  9/10/2019  Date of Consult:  9/10/2019    Reason for Consult:    Labor    History of Present Illness:  Beka Rojas is a a(n) 30year old female G acetaminophen (TYLENOL EXTRA STRENGTH) tab 500 mg, 500 mg, Oral, Q6H PRN **OR** acetaminophen (TYLENOL EXTRA STRENGTH) tab 1,000 mg, 1,000 mg, Oral, Q6H PRN  •  Ampicillin Sodium (OMNIPEN) 2 g in sodium chloride 0.9% 100 mL IVPB-minibag/add-vantage, 2 g, I infant deaths as well as 25–50% of cases of long-term neurologic impairment in children. Definition   birth is defined as birth between 21 0/7 weeks of gestation and 36 6/7 weeks of gestation.  The diagnosis of  labor generally is based on been reported.  therapeutic agents currently thought to be clearly associated with improved  outcomes include  corticosteroids, for maturation of fetal lungs and other developing organ systems, and the targeted use of magnesium sulfate for change. In a study of 1 women who had unscheduled triage visits for symptoms of  labor, only 18% gave birth before 37 weeks of gestation and only 3% gave birth within 2 weeks of presenting with symptoms.   No evidence exists to support the use of p  morbidity and mortality, a first dose of  corticosteroids should still be administered even if the ability to give the second dose is unlikely, based on the clinical scenario.  However, no additional benefit has been demonstrated for cours neuroprotection and the patient is still experiencing  labor, a different agent could be considered for short-term tocolysis.  However, because of potential serious maternal complications, beta-adrenergic receptor agonists and calcium-channel blocker negative effects, such as loss of employment, should not be underestimated.     IMPRESSION:   · IUP at 30w5d  ·  Labor  · No prenatal care    RECOMMENDATIONS:   · Administer course of betamethasone (first dose administered)  · Magnesium sulfate for n

## 2019-09-10 NOTE — PROGRESS NOTES
Report received from Sara Jim RN, transport. Pt in stable condition. Pt c/o increased uc's. Pt denies srom. Pt states small amt of bright red blood the size of nickel noted. Abd soft,nontender. Hx reviewed.  POC discussed w/pt, pt verbalizes Jefferson

## 2019-09-10 NOTE — H&P
Leonora 23 Patient Status:  Observation    1990 MRN [id number]   Location [address] [place] [address] Attending Jefferson Banks MD   Hosp Day # 0 PCP PHYSICIAN NONSTAFF     Date of Admi Medical History: History reviewed. No pertinent past medical history. Past Social History: History reviewed. No pertinent surgical history. Family History: No family history on file.   Social History: Social History    Tobacco Use      Smoking status: [employer] GLUUR Negative 09/10/2019    KETUR Negative 09/10/2019    BILUR Negative 09/10/2019    BLOODURINE Large (A) 09/10/2019    NITRITE Negative 09/10/2019    UROBILINOGEN <2.0 09/10/2019    LEUUR Large (A) 09/10/2019    UASA Negative 09/10/2019       Us Preg

## 2019-09-11 LAB
BASOPHILS # BLD AUTO: 0.03 X10(3) UL (ref 0–0.2)
BASOPHILS NFR BLD AUTO: 0.2 %
DEPRECATED RDW RBC AUTO: 42.5 FL (ref 35.1–46.3)
EOSINOPHIL # BLD AUTO: 0 X10(3) UL (ref 0–0.7)
EOSINOPHIL NFR BLD AUTO: 0 %
ERYTHROCYTE [DISTWIDTH] IN BLOOD BY AUTOMATED COUNT: 17.4 % (ref 11–15)
HCT VFR BLD AUTO: 29.4 % (ref 35–48)
HGB BLD-MCNC: 9 G/DL (ref 12–16)
IMM GRANULOCYTES # BLD AUTO: 0.14 X10(3) UL (ref 0–1)
IMM GRANULOCYTES NFR BLD: 0.7 %
LYMPHOCYTES # BLD AUTO: 1.32 X10(3) UL (ref 1–4)
LYMPHOCYTES NFR BLD AUTO: 6.9 %
MCH RBC QN AUTO: 21.1 PG (ref 26–34)
MCHC RBC AUTO-ENTMCNC: 30.6 G/DL (ref 31–37)
MCV RBC AUTO: 68.9 FL (ref 80–100)
MONOCYTES # BLD AUTO: 1.85 X10(3) UL (ref 0.1–1)
MONOCYTES NFR BLD AUTO: 9.7 %
NEUTROPHILS # BLD AUTO: 15.66 X10 (3) UL (ref 1.5–7.7)
NEUTROPHILS # BLD AUTO: 15.66 X10(3) UL (ref 1.5–7.7)
NEUTROPHILS NFR BLD AUTO: 82.5 %
PLATELET # BLD AUTO: 334 10(3)UL (ref 150–450)
RBC # BLD AUTO: 4.27 X10(6)UL (ref 3.8–5.3)
T PALLIDUM AB SER QL: NEGATIVE
WBC # BLD AUTO: 19 X10(3) UL (ref 4–11)

## 2019-09-11 NOTE — PAYOR COMM NOTE
--------------  ADMISSION REVIEW     Payor: Rodney Alaniz #:  [id number]  Authorization Number: [id number]         H&P - H&P Note      H&P signed by Daniel King DO at 9/10/2019  5:52 PM     Author:  Jeffy Patterson, auscultation bilaterally   Heart:   regular rate and rhythm, S1, S2 normal, no murmur, click, rub or gallop   Abdomen: FHT present   Fetal Surveillance:  145 BPM   Fetal heart variability: moderate      Cervix: no lesions or cervical motion tenderness and Admitted on 9/10/2019    Discharged on 9/10/2019    9/10/2019 1500 Rate/Dose Verify (none) Intravenous Monica Tomlinson RN    9/10/2019 1400 Rate/Dose Change (none) Intravenous Tena Lara RN      docusate sodium (COLACE) cap 100 mg     Date Action units/ 500 ml 0.9% NS premix infusion     Date Action Dose Route User    9/10/2019 1945 New Bag 300 mL/hr Intravenous Miguel Garcia RN      witch hazel-glycerin [facility]) pad     Date Action Dose Route User    9/10/2019 2205 Given 40 each Topic appropriate.     OBJECTIVE:        Lab Results   Component Value Date     WBC 19.0 09/11/2019     RBC 4.27 09/11/2019     HGB 9.0 09/11/2019     HCT 29.4 09/11/2019     MCV 68.9 09/11/2019     MCH 21.1 09/11/2019     MCHC 30.6 09/11/2019     RDW 17.4 09/11

## 2019-09-11 NOTE — CM/SW NOTE
CM met with mother, Marielos Phillips in NICU infant patient room. CM reviewed insurance and PCP for infant. Marielos Phillips stated that she has [address] and will need to add infant to that plan.  CM stated that she had already reached out to Kim

## 2019-09-11 NOTE — L&D DELIVERY NOTE
Sandra Castillo [[id number]]    Labor Events     labor?:  Yes   steroids?:  Partial Course  Antibiotics received during labor?:  Yes  Antibiotics (enter # doses in comment):  ampicillin  Rupture date/time:  9/10/2019 1822     Rupture ty Harpal Johnson, RN  Final count MD:  Elsa Wright MD                                                                    Vaginal Delivery Note          Anabel Kiran Patient Status:  Inpatient    1990 MRN [id number]   [facility] LABOR

## 2019-09-11 NOTE — PROGRESS NOTES
Patient admitted to mother baby. Discharge teaching initiated. Call light in reach. Oriented to room.

## 2019-09-11 NOTE — PROGRESS NOTES
[facility]  Post-Partum Progress Note    Cj Suarez Patient Status:  Inpatient    1990 MRN [id number]   [facility] 2SW-J Attending Leonela Woodard,    Hosp Day # 1 PCP PHYSICIAN NONSTAFF     SUBJECTIVE:    Postpartum Day 1

## 2019-09-11 NOTE — PROGRESS NOTES
Report received from [facility]. Patient resting comfortably in bed. POC discussed with patient. All questions answered. Patient verbalized understanding. Call light within reach.

## 2019-09-12 VITALS
RESPIRATION RATE: 18 BRPM | OXYGEN SATURATION: 99 % | WEIGHT: 238 LBS | BODY MASS INDEX: 37 KG/M2 | SYSTOLIC BLOOD PRESSURE: 126 MMHG | TEMPERATURE: 98 F | HEART RATE: 49 BPM | DIASTOLIC BLOOD PRESSURE: 77 MMHG

## 2019-09-12 NOTE — PAYOR COMM NOTE
--------------  CONTINUED STAY REVIEW    Payor: Rodney Alaniz #:  [id number]  Authorization Number: [id number]    Admit date: 9/10/19  Admit time: 1659    Admitting Physician: Fareed Head DO  Attending Kayla Mercer

## 2019-09-12 NOTE — PROGRESS NOTES
[facility]  Post-Partum Progress Note    [facility] Patient Status:  Inpatient    1990 MRN [id number]   [facility] 2SW-J Attending Mauro Spencer, [address] [facility] Day # 2 PCP PHYSICIAN NONSTAFF     SUBJECTIVE:  Patient doing well

## 2019-09-13 LAB
GENITAL VAGINOSIS SCREEN: POSITIVE
TRICHOMONAS SCREEN: NEGATIVE

## 2019-09-13 NOTE — PAYOR COMM NOTE
--------------  DISCHARGE REVIEW    Payor: Rodney Katty #:  [id number]  Authorization Number: [id number]    Admit date: 9/10/19  Admit time:  1659  Discharge Date: 9/12/2019  5:40 PM     Admitting Physician: Dalila Avila

## 2019-09-16 ENCOUNTER — TELEPHONE (OUTPATIENT)
Dept: OBGYN UNIT | Facility: HOSPITAL | Age: 29
End: 2019-09-16

## 2019-09-18 ENCOUNTER — TELEPHONE (OUTPATIENT)
Dept: OBGYN UNIT | Facility: HOSPITAL | Age: 29
End: 2019-09-18

## 2019-09-20 ENCOUNTER — TELEPHONE (OUTPATIENT)
Dept: OBGYN CLINIC | Facility: CLINIC | Age: 29
End: 2019-09-20

## 2019-11-15 ENCOUNTER — OFFICE VISIT (OUTPATIENT)
Dept: OPHTHALMOLOGY | Facility: CLINIC | Age: 29
End: 2019-11-15
Payer: MEDICAID

## 2019-11-15 DIAGNOSIS — H10.31 ACUTE BACTERIAL CONJUNCTIVITIS OF RIGHT EYE: ICD-10-CM

## 2019-11-15 DIAGNOSIS — H16.141 SPK (SUPERFICIAL PUNCTATE KERATITIS), RIGHT: Primary | ICD-10-CM

## 2019-11-15 PROCEDURE — 99242 OFF/OP CONSLTJ NEW/EST SF 20: CPT | Performed by: OPHTHALMOLOGY

## 2019-11-15 RX ORDER — OFLOXACIN 3 MG/ML
1 SOLUTION/ DROPS OPHTHALMIC 4 TIMES DAILY
Qty: 1 BOTTLE | Refills: 0 | Status: SHIPPED | OUTPATIENT
Start: 2019-11-15 | End: 2019-11-22

## 2019-11-15 NOTE — ASSESSMENT & PLAN NOTE
Ofloxacn drops 4 times a day right eye. No contact in that eye for 1 week. Patient is not breastfeeding now.

## 2019-11-15 NOTE — ASSESSMENT & PLAN NOTE
Contact lens overwear with infection now. Ofloxacin drops 4 times a day x 1 week. Keep contact out. Kolton Ramos

## 2019-11-15 NOTE — PATIENT INSTRUCTIONS
Acute bacterial conjunctivitis of right eye  Ofloxacn drops 4 times a day right eye. No contact in that eye for 1 week. Patient is not breastfeeding now. SPK (superficial punctate keratitis), right  Contact lens overwear with infection now.  Ofloxacin dr

## 2019-11-16 NOTE — PROGRESS NOTES
Iván Sandoval is a 30year old female. HPI:     HPI     NP. 31 yo F here for evaluation of conjunctivitis symptoms OD. Patient complains of redness, tearing, discharge, and pain in the right eye since 11/11/19.   She has had the right contact out sinc Right Left    Disc Normal Normal    Macula Normal Normal    Vessels Normal Normal            Refraction     Wearing Rx     Type:  not here                  ASSESSMENT/PLAN:     Diagnoses and Plan:     Acute bacterial conjunctivitis of right eye  Oflox

## (undated) NOTE — ED AVS SNAPSHOT
Poonam Renner   MRN: [id number]    Department:  [facility] Emergency Department   Date of Visit:  8/29/2017           Disclosure     Insurance plans vary and the physician(s) referred by the ER may not be covered by your plan.  Please contact CARE PHYSICIAN AT ONCE OR RETURN IMMEDIATELY TO THE EMERGENCY DEPARTMENT. If you have been prescribed any medication(s), please fill your prescription right away and begin taking the medication(s) as directed.   If you believe that any of the medications

## (undated) NOTE — LETTER
19          RE:  Hazel Boothe  :  1990      To Whom It May Concern:    Hazel Boothe is cleared to return to work without restrictions at this time. If you have any additional concerns, do not hesitate to contact our office.     Sincerel

## (undated) NOTE — LETTER
November 15, 2019    Gisell Flynn MD  [facility] [address] [address]     Patient: Asim Vieyra   YOB: 1990   Date of Visit: 11/15/2019       Dear Dr. Brandie Esqueda MD:    Thank you for referring Asim Augustrebeca Slit Lamp Exam       Right Left    Lids/Lashes Normal Normal    Conjunctiva/Sclera 3+ Injection  Normal contct good fit but protein deposits    Cornea spk perilimbal superiorly Clear    Anterior Chamber Deep and quiet Deep and quiet    Iris Normal Normal